# Patient Record
Sex: FEMALE
[De-identification: names, ages, dates, MRNs, and addresses within clinical notes are randomized per-mention and may not be internally consistent; named-entity substitution may affect disease eponyms.]

---

## 2019-07-01 PROBLEM — Z00.00 ENCOUNTER FOR PREVENTIVE HEALTH EXAMINATION: Status: ACTIVE | Noted: 2019-07-01

## 2019-07-22 ENCOUNTER — APPOINTMENT (OUTPATIENT)
Dept: ENDOCRINOLOGY | Facility: CLINIC | Age: 59
End: 2019-07-22

## 2019-08-06 ENCOUNTER — APPOINTMENT (OUTPATIENT)
Dept: ENDOCRINOLOGY | Facility: CLINIC | Age: 59
End: 2019-08-06
Payer: COMMERCIAL

## 2019-08-06 VITALS
SYSTOLIC BLOOD PRESSURE: 120 MMHG | WEIGHT: 211 LBS | HEART RATE: 76 BPM | DIASTOLIC BLOOD PRESSURE: 78 MMHG | HEIGHT: 65.5 IN | BODY MASS INDEX: 34.73 KG/M2

## 2019-08-06 DIAGNOSIS — Z80.1 FAMILY HISTORY OF MALIGNANT NEOPLASM OF TRACHEA, BRONCHUS AND LUNG: ICD-10-CM

## 2019-08-06 DIAGNOSIS — Z87.891 PERSONAL HISTORY OF NICOTINE DEPENDENCE: ICD-10-CM

## 2019-08-06 DIAGNOSIS — Z98.890 OTHER SPECIFIED POSTPROCEDURAL STATES: ICD-10-CM

## 2019-08-06 DIAGNOSIS — R73.03 PREDIABETES.: ICD-10-CM

## 2019-08-06 DIAGNOSIS — Z83.3 FAMILY HISTORY OF DIABETES MELLITUS: ICD-10-CM

## 2019-08-06 DIAGNOSIS — E27.9 DISORDER OF ADRENAL GLAND, UNSPECIFIED: ICD-10-CM

## 2019-08-06 DIAGNOSIS — Z82.49 FAMILY HISTORY OF ISCHEMIC HEART DISEASE AND OTHER DISEASES OF THE CIRCULATORY SYSTEM: ICD-10-CM

## 2019-08-06 DIAGNOSIS — Z78.9 OTHER SPECIFIED HEALTH STATUS: ICD-10-CM

## 2019-08-06 PROCEDURE — 99205 OFFICE O/P NEW HI 60 MIN: CPT

## 2019-08-06 NOTE — HISTORY OF PRESENT ILLNESS
[FreeTextEntry1] : 59 yr \par h/o adrenal nodules dxed in 2010 , followed by Dr Mathews with CT scans likely non functional - rt 1.3 cm adrenal adenoma \par h/o carcinoid tumor - follow Dr CORDERO at [facility] , treated 2 years \par records awaited \par follow up for same \par no weight changes \par no fractures \par has prediabetes \par blood work done in June , s/o A1c of 6.2 % , tsh of 3.8 \par no h/o uncontrolled blood pressure. \par Dr David  - [place] \par h/o partial  thyroidectomy in 2014 for goiter - left side \par levothyroxine - last took in Dec 2018 \par c/o headaches - new onset -within last year. -no fainting spells \par no sweating \par

## 2019-09-10 ENCOUNTER — APPOINTMENT (OUTPATIENT)
Dept: ENDOCRINOLOGY | Facility: CLINIC | Age: 59
End: 2019-09-10

## 2020-01-17 ENCOUNTER — APPOINTMENT (OUTPATIENT)
Dept: ENDOCRINOLOGY | Facility: CLINIC | Age: 60
End: 2020-01-17

## 2024-07-10 ENCOUNTER — RX ONLY (RX ONLY)
Age: 64
End: 2024-07-10

## 2024-07-10 ENCOUNTER — OFFICE (OUTPATIENT)
Dept: URBAN - METROPOLITAN AREA CLINIC 86 | Facility: CLINIC | Age: 64
Setting detail: OPHTHALMOLOGY
End: 2024-07-10
Payer: COMMERCIAL

## 2024-07-10 DIAGNOSIS — B30.9: ICD-10-CM

## 2024-07-10 PROCEDURE — 99203 OFFICE O/P NEW LOW 30 MIN: CPT | Performed by: OPHTHALMOLOGY

## 2024-07-10 ASSESSMENT — CONFRONTATIONAL VISUAL FIELD TEST (CVF)
OS_FINDINGS: FULL
OD_FINDINGS: FULL

## 2024-08-08 ENCOUNTER — OFFICE (OUTPATIENT)
Dept: URBAN - METROPOLITAN AREA CLINIC 86 | Facility: CLINIC | Age: 64
Setting detail: OPHTHALMOLOGY
End: 2024-08-08
Payer: COMMERCIAL

## 2024-08-08 DIAGNOSIS — H40.003: ICD-10-CM

## 2024-08-08 DIAGNOSIS — H01.004: ICD-10-CM

## 2024-08-08 DIAGNOSIS — H16.223: ICD-10-CM

## 2024-08-08 DIAGNOSIS — H01.001: ICD-10-CM

## 2024-08-08 DIAGNOSIS — H25.13: ICD-10-CM

## 2024-08-08 DIAGNOSIS — H52.4: ICD-10-CM

## 2024-08-08 PROCEDURE — 92250 FUNDUS PHOTOGRAPHY W/I&R: CPT | Performed by: OPHTHALMOLOGY

## 2024-08-08 PROCEDURE — 92014 COMPRE OPH EXAM EST PT 1/>: CPT | Performed by: OPHTHALMOLOGY

## 2024-08-08 PROCEDURE — 92015 DETERMINE REFRACTIVE STATE: CPT | Performed by: OPHTHALMOLOGY

## 2024-08-08 ASSESSMENT — CONFRONTATIONAL VISUAL FIELD TEST (CVF)
OS_FINDINGS: FULL
OD_FINDINGS: FULL

## 2024-08-08 ASSESSMENT — LID EXAM ASSESSMENTS
OS_BLEPHARITIS: LUL T
OD_BLEPHARITIS: RUL T

## 2024-08-30 PROBLEM — H01.004 BLEPHARITIS; RIGHT UPPER LID, LEFT UPPER LID: Status: ACTIVE | Noted: 2024-08-08

## 2024-08-30 PROBLEM — H40.003 GLAUCOMA SUSPECT; BOTH EYES: Status: ACTIVE | Noted: 2024-08-08

## 2024-08-30 PROBLEM — H25.13 CATARACT SENILE NUCLEAR SCLEROSIS; BOTH EYES: Status: ACTIVE | Noted: 2024-08-08

## 2024-08-30 PROBLEM — H16.223 DRY EYE SYNDROME K SICCA; BOTH EYES: Status: ACTIVE | Noted: 2024-08-08

## 2024-08-30 PROBLEM — H01.001 BLEPHARITIS; RIGHT UPPER LID, LEFT UPPER LID: Status: ACTIVE | Noted: 2024-08-08

## 2024-11-20 ENCOUNTER — OFFICE (OUTPATIENT)
Dept: URBAN - METROPOLITAN AREA CLINIC 86 | Facility: CLINIC | Age: 64
Setting detail: OPHTHALMOLOGY
End: 2024-11-20
Payer: COMMERCIAL

## 2024-11-20 DIAGNOSIS — H16.223: ICD-10-CM

## 2024-11-20 DIAGNOSIS — H25.13: ICD-10-CM

## 2024-11-20 DIAGNOSIS — H11.31: ICD-10-CM

## 2024-11-20 DIAGNOSIS — H01.004: ICD-10-CM

## 2024-11-20 DIAGNOSIS — H40.003: ICD-10-CM

## 2024-11-20 DIAGNOSIS — H01.001: ICD-10-CM

## 2024-11-20 PROCEDURE — 99213 OFFICE O/P EST LOW 20 MIN: CPT | Performed by: OPHTHALMOLOGY

## 2024-11-20 ASSESSMENT — REFRACTION_MANIFEST
OS_VA1: 20/20
OD_ADD: +2.00
OD_SPHERE: -0.25
OS_CYLINDER: +1.00
OS_AXIS: 175
OU_VA: 20/20
OD_VA1: 20/20
OS_SPHERE: -0.75
OD_CYLINDER: +0.50
OD_AXIS: 180
OS_ADD: +2.00

## 2024-11-20 ASSESSMENT — LID EXAM ASSESSMENTS
OD_BLEPHARITIS: RUL T
OS_BLEPHARITIS: LUL T

## 2024-11-20 ASSESSMENT — VISUAL ACUITY
OS_BCVA: 20/20-
OD_BCVA: 20/25-

## 2024-11-20 ASSESSMENT — REFRACTION_AUTOREFRACTION
OS_CYLINDER: +1.25
OD_CYLINDER: +1.50
OD_SPHERE: -0.50
OD_AXIS: 179
OS_AXIS: 174
OS_SPHERE: -0.75

## 2024-11-20 ASSESSMENT — PACHYMETRY
OS_CT_CORRECTION: -2
OS_CT_UM: 574
OD_CT_UM: 574
OD_CT_CORRECTION: -2

## 2024-11-20 ASSESSMENT — TONOMETRY
OS_IOP_MMHG: 14
OD_IOP_MMHG: 14

## 2024-11-20 ASSESSMENT — CONFRONTATIONAL VISUAL FIELD TEST (CVF)
OS_FINDINGS: FULL
OD_FINDINGS: FULL

## 2024-11-20 ASSESSMENT — TEAR BREAK UP TIME (TBUT)
OD_TBUT: T 1+
OS_TBUT: T 1+